# Patient Record
Sex: FEMALE | Race: WHITE | NOT HISPANIC OR LATINO
[De-identification: names, ages, dates, MRNs, and addresses within clinical notes are randomized per-mention and may not be internally consistent; named-entity substitution may affect disease eponyms.]

---

## 2017-08-16 ENCOUNTER — APPOINTMENT (OUTPATIENT)
Dept: ELECTROPHYSIOLOGY | Facility: CLINIC | Age: 62
End: 2017-08-16
Payer: COMMERCIAL

## 2017-08-16 ENCOUNTER — NON-APPOINTMENT (OUTPATIENT)
Age: 62
End: 2017-08-16

## 2017-08-16 VITALS
DIASTOLIC BLOOD PRESSURE: 79 MMHG | HEART RATE: 65 BPM | SYSTOLIC BLOOD PRESSURE: 129 MMHG | WEIGHT: 130 LBS | HEIGHT: 67 IN | BODY MASS INDEX: 20.4 KG/M2 | OXYGEN SATURATION: 100 %

## 2017-08-16 DIAGNOSIS — Z86.79 PERSONAL HISTORY OF OTHER DISEASES OF THE CIRCULATORY SYSTEM: ICD-10-CM

## 2017-08-16 DIAGNOSIS — R53.83 OTHER FATIGUE: ICD-10-CM

## 2017-08-16 DIAGNOSIS — Z87.19 PERSONAL HISTORY OF OTHER DISEASES OF THE DIGESTIVE SYSTEM: ICD-10-CM

## 2017-08-16 PROCEDURE — 99205 OFFICE O/P NEW HI 60 MIN: CPT

## 2017-08-16 PROCEDURE — 93000 ELECTROCARDIOGRAM COMPLETE: CPT

## 2017-09-01 ENCOUNTER — INPATIENT (INPATIENT)
Facility: HOSPITAL | Age: 62
LOS: 0 days | Discharge: HOME | DRG: 274 | End: 2017-09-02
Attending: INTERNAL MEDICINE | Admitting: INTERNAL MEDICINE
Payer: COMMERCIAL

## 2017-09-01 ENCOUNTER — TRANSCRIPTION ENCOUNTER (OUTPATIENT)
Age: 62
End: 2017-09-01

## 2017-09-01 VITALS
RESPIRATION RATE: 20 BRPM | TEMPERATURE: 98 F | WEIGHT: 130.07 LBS | DIASTOLIC BLOOD PRESSURE: 75 MMHG | OXYGEN SATURATION: 100 % | SYSTOLIC BLOOD PRESSURE: 153 MMHG | HEART RATE: 84 BPM | HEIGHT: 67 IN

## 2017-09-01 DIAGNOSIS — Z98.890 OTHER SPECIFIED POSTPROCEDURAL STATES: Chronic | ICD-10-CM

## 2017-09-01 DIAGNOSIS — I48.0 PAROXYSMAL ATRIAL FIBRILLATION: ICD-10-CM

## 2017-09-01 DIAGNOSIS — Z29.9 ENCOUNTER FOR PROPHYLACTIC MEASURES, UNSPECIFIED: ICD-10-CM

## 2017-09-01 DIAGNOSIS — I48.91 UNSPECIFIED ATRIAL FIBRILLATION: ICD-10-CM

## 2017-09-01 LAB
ALBUMIN SERPL ELPH-MCNC: 4.1 G/DL — SIGNIFICANT CHANGE UP (ref 3.3–5)
ALP SERPL-CCNC: 49 U/L — SIGNIFICANT CHANGE UP (ref 40–120)
ALT FLD-CCNC: 22 U/L RC — SIGNIFICANT CHANGE UP (ref 10–45)
ANION GAP SERPL CALC-SCNC: 14 MMOL/L — SIGNIFICANT CHANGE UP (ref 5–17)
AST SERPL-CCNC: 27 U/L — SIGNIFICANT CHANGE UP (ref 10–40)
BILIRUB SERPL-MCNC: 0.3 MG/DL — SIGNIFICANT CHANGE UP (ref 0.2–1.2)
BLD GP AB SCN SERPL QL: NEGATIVE — SIGNIFICANT CHANGE UP
BUN SERPL-MCNC: 12 MG/DL — SIGNIFICANT CHANGE UP (ref 7–23)
CALCIUM SERPL-MCNC: 8.6 MG/DL — SIGNIFICANT CHANGE UP (ref 8.4–10.5)
CHLORIDE SERPL-SCNC: 105 MMOL/L — SIGNIFICANT CHANGE UP (ref 96–108)
CO2 SERPL-SCNC: 23 MMOL/L — SIGNIFICANT CHANGE UP (ref 22–31)
CREAT SERPL-MCNC: 0.64 MG/DL — SIGNIFICANT CHANGE UP (ref 0.5–1.3)
GLUCOSE SERPL-MCNC: 120 MG/DL — HIGH (ref 70–99)
HCT VFR BLD CALC: 37.7 % — SIGNIFICANT CHANGE UP (ref 34.5–45)
HGB BLD-MCNC: 12.8 G/DL — SIGNIFICANT CHANGE UP (ref 11.5–15.5)
MAGNESIUM SERPL-MCNC: 1.7 MG/DL — SIGNIFICANT CHANGE UP (ref 1.6–2.6)
MCHC RBC-ENTMCNC: 32 PG — SIGNIFICANT CHANGE UP (ref 27–34)
MCHC RBC-ENTMCNC: 33.9 GM/DL — SIGNIFICANT CHANGE UP (ref 32–36)
MCV RBC AUTO: 94.6 FL — SIGNIFICANT CHANGE UP (ref 80–100)
PHOSPHATE SERPL-MCNC: 3 MG/DL — SIGNIFICANT CHANGE UP (ref 2.5–4.5)
PLATELET # BLD AUTO: 193 K/UL — SIGNIFICANT CHANGE UP (ref 150–400)
POTASSIUM SERPL-MCNC: 4 MMOL/L — SIGNIFICANT CHANGE UP (ref 3.5–5.3)
POTASSIUM SERPL-SCNC: 4 MMOL/L — SIGNIFICANT CHANGE UP (ref 3.5–5.3)
PROT SERPL-MCNC: 6.4 G/DL — SIGNIFICANT CHANGE UP (ref 6–8.3)
RBC # BLD: 3.98 M/UL — SIGNIFICANT CHANGE UP (ref 3.8–5.2)
RBC # FLD: 12.8 % — SIGNIFICANT CHANGE UP (ref 10.3–14.5)
RH IG SCN BLD-IMP: POSITIVE — SIGNIFICANT CHANGE UP
SODIUM SERPL-SCNC: 142 MMOL/L — SIGNIFICANT CHANGE UP (ref 135–145)
WBC # BLD: 6.1 K/UL — SIGNIFICANT CHANGE UP (ref 3.8–10.5)
WBC # FLD AUTO: 6.1 K/UL — SIGNIFICANT CHANGE UP (ref 3.8–10.5)

## 2017-09-01 PROCEDURE — 93656 COMPRE EP EVAL ABLTJ ATR FIB: CPT

## 2017-09-01 PROCEDURE — 93655 ICAR CATH ABLTJ DSCRT ARRHYT: CPT

## 2017-09-01 PROCEDURE — 93613 INTRACARDIAC EPHYS 3D MAPG: CPT

## 2017-09-01 PROCEDURE — 93623 PRGRMD STIMJ&PACG IV RX NFS: CPT | Mod: 26

## 2017-09-01 PROCEDURE — 93662 INTRACARDIAC ECG (ICE): CPT | Mod: 26

## 2017-09-01 PROCEDURE — 93010 ELECTROCARDIOGRAM REPORT: CPT

## 2017-09-01 RX ORDER — SUCRALFATE 1 G
1 TABLET ORAL EVERY 6 HOURS
Qty: 0 | Refills: 0 | Status: DISCONTINUED | OUTPATIENT
Start: 2017-09-01 | End: 2017-09-02

## 2017-09-01 RX ORDER — PANTOPRAZOLE SODIUM 20 MG/1
40 TABLET, DELAYED RELEASE ORAL
Qty: 0 | Refills: 0 | Status: DISCONTINUED | OUTPATIENT
Start: 2017-09-01 | End: 2017-09-02

## 2017-09-01 RX ORDER — METOPROLOL TARTRATE 50 MG
50 TABLET ORAL
Qty: 0 | Refills: 0 | Status: DISCONTINUED | OUTPATIENT
Start: 2017-09-01 | End: 2017-09-02

## 2017-09-01 RX ORDER — METOPROLOL TARTRATE 50 MG
1 TABLET ORAL
Qty: 0 | Refills: 0 | COMMUNITY

## 2017-09-01 RX ORDER — APIXABAN 2.5 MG/1
1 TABLET, FILM COATED ORAL
Qty: 0 | Refills: 0 | COMMUNITY

## 2017-09-01 RX ADMIN — Medication 1 GRAM(S): at 22:47

## 2017-09-01 RX ADMIN — Medication 50 MILLIGRAM(S): at 22:47

## 2017-09-01 NOTE — PROGRESS NOTE ADULT - PROBLEM SELECTOR PLAN 1
admit to CCU2; admission labs  monitor rhythm  monitor bilateral groin access sites  Start Protonix & Carafate and mechanical soft diet  Restart home Metoprolol  Start Heparin infusion 6 hours post sheath removal (11pm tonight) if access sites stable.  Monitor PTT to therapeutic  TTE in am for evaluation pericardial effusion

## 2017-09-01 NOTE — DISCHARGE NOTE ADULT - CARE PROVIDER_API CALL
Salvatore Sheth), Cardiac Electrophysiology; Cardiology  [address] [place] [address]  Phone: [phone]  Fax: [phone]

## 2017-09-01 NOTE — H&P CARDIOLOGY - HISTORY OF PRESENT ILLNESS
63 y/o female with h/o Breast CA s/p left lumpectomy, tx with radiation and chemotherapy and PAF s/p ablation in 3/2017 endorsing several episodes of palpitations, lightheadedness and dyspnea post ablation.  Patient was evaluated by Dr. Sheth and is now for Afib ablation.

## 2017-09-01 NOTE — PROGRESS NOTE ADULT - SUBJECTIVE AND OBJECTIVE BOX
CCU Accept Note    Transfer from: EP lab via bed    HPI:  61 y/o female with h/o Breast CA s/p left lumpectomy, tx with radiation and chemotherapy and PAF s/p ablation in 3/2017 endorsing several episodes of palpitations, lightheadedness and dyspnea post ablation.  Patient was evaluated by Dr. Sheth and is now for Afib ablation. (01 Sep 2017 11:46)    Interval - now s/p A fib ablation & AVNRT ablated    OBJECTIVE DATA:    Vital Signs Last 24 Hrs  T(C): 36.5 (01 Sep 2017 11:46), Max: 36.5 (01 Sep 2017 11:46)  T(F): 97.7 (01 Sep 2017 11:46), Max: 97.7 (01 Sep 2017 11:46)  HR: 83 (01 Sep 2017 18:45) (83 - 98)  BP: 153/75 (01 Sep 2017 11:46) (153/75 - 153/75)  BP(mean): 101 (01 Sep 2017 11:46) (101 - 101)  RR: 15 (01 Sep 2017 18:45) (15 - 22)  SpO2: 98% (01 Sep 2017 18:45) (98% - 100%)  I&O's Summary  from EP lab I/O =1600/ 200    PHYSICAL EXAM:  Constitutional:    HEENT:    Respiratory:    Cardiovascular:    Gastrointestinal:    Genitourinary:    Extremities:    Vascular:    Neurological:    Skin:    Lymph Nodes:    Musculoskeletal:    Psychiatric:        Allergies:      MEDICATIONS  (STANDING):  metoprolol 50 milliGRAM(s) Oral two times a day  pantoprazole    Tablet 40 milliGRAM(s) Oral before breakfast  sucralfate 1 Gram(s) Oral every 6 hours    MEDICATIONS  (PRN):      LABS                                            12.8                  Neurophils% (auto):   x      (09-01 @ 18:18):    6.1  )-----------(193          Lymphocytes% (auto):  x                                             37.7                   Eosinphils% (auto):   x        Manual%: Neutrophils x    ; Lymphocytes x    ; Eosinophils x    ; Bands%: x    ; Blasts x                                    142    |  105    |  12                  Calcium: 8.6   / iCa: x      (09-01 @ 18:18)    ----------------------------<  120       Magnesium: 1.7                              4.0     |  23     |  0.64             Phosphorous: 3.0      TPro  6.4    /  Alb  4.1    /  TBili  0.3    /  DBili  x      /  AST  27     /  ALT  22     /  AlkPhos  49     01 Sep 2017 18:18        EKG:  Telemetry:    ASSESSMENT & PLAN: CCU Accept Note    Transfer from: EP lab via bed    HPI:  61 y/o female with h/o Breast CA s/p left lumpectomy, tx with radiation and chemotherapy and PAF s/p ablation in 3/2017 endorsing several episodes of palpitations, lightheadedness and dyspnea post ablation.  Patient was evaluated by Dr. Sheth and is now for Afib ablation. (01 Sep 2017 11:46)    Interval - now s/p A fib ablation & AVNRT ablated    OBJECTIVE DATA:    Vital Signs Last 24 Hrs  T(C): 36.5 (01 Sep 2017 11:46), Max: 36.5 (01 Sep 2017 11:46)  T(F): 97.7 (01 Sep 2017 11:46), Max: 97.7 (01 Sep 2017 11:46)  HR: 83 (01 Sep 2017 18:45) (83 - 98)  BP: 153/75 (01 Sep 2017 11:46) (153/75 - 153/75)  BP(mean): 101 (01 Sep 2017 11:46) (101 - 101)  RR: 15 (01 Sep 2017 18:45) (15 - 22)  SpO2: 98% (01 Sep 2017 18:45) (98% - 100%)  I&O's Summary  from EP lab I/O =1600/ 200    PHYSICAL EXAM:  Constitutional: normal, sleepy easily awaken NAD  HEENT: NC/AT; sclera clear  Respiratory: regular unlabored; CTA  Cardiovascular: RRR, S1 S2; no M/R/G, no edema  Bilateral groin access sites - dressings with suture D & I, soft no hematoma/ecchymosis; left site slightly tender to touch  Gastrointestinal: soft ND/NT; + bowel sounds  Genitourinary: urine cath in place draining clear yellow urine  Extremities: VELASQUEZ equal strength  Vascular: warm peripherally, +DP  Neurological: A & O x3   Skin: warm dry intact no rash, ecchymosis or cyanosis  Lymph Nodes: no lymphadenopathy  Musculoskeletal: no deformity, VELASQUEZ  Psychiatric: mood & affect appropriate        Allergies:      MEDICATIONS  (STANDING):  metoprolol 50 milliGRAM(s) Oral two times a day  pantoprazole    Tablet 40 milliGRAM(s) Oral before breakfast  sucralfate 1 Gram(s) Oral every 6 hours    MEDICATIONS  (PRN):      LABS                                            12.8                  Neurophils% (auto):   x      (09-01 @ 18:18):    6.1  )-----------(193          Lymphocytes% (auto):  x                                             37.7                   Eosinphils% (auto):   x        Manual%: Neutrophils x    ; Lymphocytes x    ; Eosinophils x    ; Bands%: x    ; Blasts x                                    142    |  105    |  12                  Calcium: 8.6   / iCa: x      (09-01 @ 18:18)    ----------------------------<  120       Magnesium: 1.7                              4.0     |  23     |  0.64             Phosphorous: 3.0      TPro  6.4    /  Alb  4.1    /  TBili  0.3    /  DBili  x      /  AST  27     /  ALT  22     /  AlkPhos  49     01 Sep 2017 18:18          Telemetry: sinus rhythm no ectopy or events    ASSESSMENT & PLAN:

## 2017-09-01 NOTE — PROGRESS NOTE ADULT - ASSESSMENT
61 y/o female with h/o Breast CA s/p left lumpectomy, tx with radiation and chemotherapy and PAF s/p ablation in 3/2017 endorsing several episodes of palpitations, lightheadedness and dyspnea. Now s/p A fib ablation

## 2017-09-01 NOTE — DISCHARGE NOTE ADULT - PLAN OF CARE
will remain in sinus rhythm It is important to continue your normal daily activities and to continue to walk as much as possible; with periods of rest when necessary.  Do not perform any strenuous activity or heavy lifting until cleared by Dr. Sheth. Your heart muscle is currently recovering and you should not be exerting it.  In addition there is no bathing in a bathtub, swimming, or submerging you in water until cleared by Dr. Sheth. You have incisions that need to heal and bathing/swimming can expose it to bacteria.  No creams to your incisions. You may remove your dressings when you get home. You may shower. Allow soap and water to run over your incision and pat area dry. Ensure that your groin incisions remain dry at all times to prevent risk of infection.  Follow up with Dr. Sheth, you have an appointment scheduled for October 19th at 8:20am. Follow up with your primary cardiologist as well.   You are restarting Eliquis. Eliquis is a blood thinner and therefore you are at higher risk of bleeding. If you experience any signs of atrial fibrillation such as dizziness, fatigue, palpitations, or fainting please inform Dr. Sheht as soon as possible or go to your nearest emergency room.  If you experience any signs of bleeding such as bleeding gums, bloody sputum, bleeding in your stool or black stool inform your primary care doctor;  You should continue to maintain a heart healthy diet ( low cholesterol, low sodium, and low in saturated fats).You should also continue to eat a soft diet. A soft diet means food that is pureed in consistency, like mash potatoes, yogurts, soft cooked vegetables, soups. You should maintain this diet for one month. This will prevent any irritation to your esophagus which may be weakened due to the procedure. You also should take your carafate and protonix medications as prescribed, for one month to prevent any acid from your stomach. This will prevent further irritation by reducing the acid content in your stomach.

## 2017-09-01 NOTE — DISCHARGE NOTE ADULT - CONDITIONS AT DISCHARGE
A&Ox4, denies any chest pain or shortness of breath. Bilateral groin sites benign, no bleeding or hematoma noted. Right radial site is benign, no bleeding or hematoma noted.

## 2017-09-01 NOTE — DISCHARGE NOTE ADULT - PATIENT PORTAL LINK FT
“You can access the [employer] Patient Portal, offered by [facility], by registering with the following website: [email or web address]”

## 2017-09-01 NOTE — DISCHARGE NOTE ADULT - CARE PLAN
Principal Discharge DX:	Paroxysmal atrial fibrillation  Goal:	will remain in sinus rhythm  Instructions for follow-up, activity and diet:	It is important to continue your normal daily activities and to continue to walk as much as possible; with periods of rest when necessary.  Do not perform any strenuous activity or heavy lifting until cleared by Dr. Sheth. Your heart muscle is currently recovering and you should not be exerting it.  In addition there is no bathing in a bathtub, swimming, or submerging you in water until cleared by Dr. Sheth. You have incisions that need to heal and bathing/swimming can expose it to bacteria.  No creams to your incisions. You may remove your dressings when you get home. You may shower. Allow soap and water to run over your incision and pat area dry. Ensure that your groin incisions remain dry at all times to prevent risk of infection.  Follow up with Dr. Sheth, you have an appointment scheduled for October 19th at 8:20am. Follow up with your primary cardiologist as well.   You are restarting Eliquis. Eliquis is a blood thinner and therefore you are at higher risk of bleeding. If you experience any signs of atrial fibrillation such as dizziness, fatigue, palpitations, or fainting please inform Dr. Sheth as soon as possible or go to your nearest emergency room.  If you experience any signs of bleeding such as bleeding gums, bloody sputum, bleeding in your stool or black stool inform your primary care doctor;  You should continue to maintain a heart healthy diet ( low cholesterol, low sodium, and low in saturated fats).You should also continue to eat a soft diet. A soft diet means food that is pureed in consistency, like mash potatoes, yogurts, soft cooked vegetables, soups. You should maintain this diet for one month. This will prevent any irritation to your esophagus which may be weakened due to the procedure. You also should take your carafate and protonix medications as prescribed, for one month to prevent any acid from your stomach. This will prevent further irritation by reducing the acid content in your stomach.

## 2017-09-01 NOTE — DISCHARGE NOTE ADULT - MEDICATION SUMMARY - MEDICATIONS TO TAKE
I will START or STAY ON the medications listed below when I get home from the hospital:    Eliquis 5 mg oral tablet  -- 1 tab(s) by mouth 2 times a day  -- Indication: For Atrial fibrillation    metoprolol tartrate 50 mg oral tablet  -- 1 tab(s) by mouth 2 times a day  -- Indication: For Atrial fibrillation    sucralfate 1 g oral tablet  -- 1 tab(s) by mouth every 6 hours  -- Indication: For Atrial fibrillation    pantoprazole 40 mg oral delayed release tablet  -- 1 tab(s) by mouth once a day (before a meal)  -- Indication: For Atrial fibrillation

## 2017-09-01 NOTE — DISCHARGE NOTE ADULT - HOSPITAL COURSE
63 y/o female with h/o Breast CA s/p left lumpectomy, tx with radiation and chemotherapy and PAF (s/p ablation in 3/2017 endorsing several episodes of palpitations, lightheadedness and dyspnea post ablation).   Now s/p A fib ablation 63 y/o female with h/o Breast CA s/p left lumpectomy, tx with radiation and chemotherapy and PAF (s/p ablation in 3/2017 endorsing several episodes of palpitations, lightheadedness and dyspnea post ablation). Now s/p A fib ablation.  TTE shows no pericardial effusion and patient restarted on Eliquis.

## 2017-09-02 VITALS
SYSTOLIC BLOOD PRESSURE: 129 MMHG | OXYGEN SATURATION: 98 % | RESPIRATION RATE: 18 BRPM | HEART RATE: 68 BPM | DIASTOLIC BLOOD PRESSURE: 58 MMHG

## 2017-09-02 DIAGNOSIS — H53.19 OTHER SUBJECTIVE VISUAL DISTURBANCES: ICD-10-CM

## 2017-09-02 LAB
ALBUMIN SERPL ELPH-MCNC: 3.8 G/DL — SIGNIFICANT CHANGE UP (ref 3.3–5)
ALP SERPL-CCNC: 47 U/L — SIGNIFICANT CHANGE UP (ref 40–120)
ALT FLD-CCNC: 20 U/L RC — SIGNIFICANT CHANGE UP (ref 10–45)
ANION GAP SERPL CALC-SCNC: 12 MMOL/L — SIGNIFICANT CHANGE UP (ref 5–17)
APTT BLD: 27.8 SEC — SIGNIFICANT CHANGE UP (ref 27.5–37.4)
AST SERPL-CCNC: 28 U/L — SIGNIFICANT CHANGE UP (ref 10–40)
BILIRUB SERPL-MCNC: 0.4 MG/DL — SIGNIFICANT CHANGE UP (ref 0.2–1.2)
BUN SERPL-MCNC: 13 MG/DL — SIGNIFICANT CHANGE UP (ref 7–23)
CALCIUM SERPL-MCNC: 8.6 MG/DL — SIGNIFICANT CHANGE UP (ref 8.4–10.5)
CHLORIDE SERPL-SCNC: 104 MMOL/L — SIGNIFICANT CHANGE UP (ref 96–108)
CO2 SERPL-SCNC: 25 MMOL/L — SIGNIFICANT CHANGE UP (ref 22–31)
CREAT SERPL-MCNC: 0.65 MG/DL — SIGNIFICANT CHANGE UP (ref 0.5–1.3)
GLUCOSE SERPL-MCNC: 96 MG/DL — SIGNIFICANT CHANGE UP (ref 70–99)
HCT VFR BLD CALC: 37 % — SIGNIFICANT CHANGE UP (ref 34.5–45)
HGB BLD-MCNC: 12.8 G/DL — SIGNIFICANT CHANGE UP (ref 11.5–15.5)
INR BLD: 0.98 RATIO — SIGNIFICANT CHANGE UP (ref 0.88–1.16)
MAGNESIUM SERPL-MCNC: 1.9 MG/DL — SIGNIFICANT CHANGE UP (ref 1.6–2.6)
MCHC RBC-ENTMCNC: 32.7 PG — SIGNIFICANT CHANGE UP (ref 27–34)
MCHC RBC-ENTMCNC: 34.5 GM/DL — SIGNIFICANT CHANGE UP (ref 32–36)
MCV RBC AUTO: 94.8 FL — SIGNIFICANT CHANGE UP (ref 80–100)
PHOSPHATE SERPL-MCNC: 3.7 MG/DL — SIGNIFICANT CHANGE UP (ref 2.5–4.5)
PLATELET # BLD AUTO: 185 K/UL — SIGNIFICANT CHANGE UP (ref 150–400)
POTASSIUM SERPL-MCNC: 4.1 MMOL/L — SIGNIFICANT CHANGE UP (ref 3.5–5.3)
POTASSIUM SERPL-SCNC: 4.1 MMOL/L — SIGNIFICANT CHANGE UP (ref 3.5–5.3)
PROT SERPL-MCNC: 6.1 G/DL — SIGNIFICANT CHANGE UP (ref 6–8.3)
PROTHROM AB SERPL-ACNC: 10.6 SEC — SIGNIFICANT CHANGE UP (ref 9.8–12.7)
RBC # BLD: 3.9 M/UL — SIGNIFICANT CHANGE UP (ref 3.8–5.2)
RBC # FLD: 12.8 % — SIGNIFICANT CHANGE UP (ref 10.3–14.5)
SODIUM SERPL-SCNC: 141 MMOL/L — SIGNIFICANT CHANGE UP (ref 135–145)
WBC # BLD: 9.2 K/UL — SIGNIFICANT CHANGE UP (ref 3.8–10.5)
WBC # FLD AUTO: 9.2 K/UL — SIGNIFICANT CHANGE UP (ref 3.8–10.5)

## 2017-09-02 PROCEDURE — 93655 ICAR CATH ABLTJ DSCRT ARRHYT: CPT

## 2017-09-02 PROCEDURE — 93623 PRGRMD STIMJ&PACG IV RX NFS: CPT

## 2017-09-02 PROCEDURE — 83735 ASSAY OF MAGNESIUM: CPT

## 2017-09-02 PROCEDURE — 99232 SBSQ HOSP IP/OBS MODERATE 35: CPT | Mod: GC

## 2017-09-02 PROCEDURE — 93613 INTRACARDIAC EPHYS 3D MAPG: CPT

## 2017-09-02 PROCEDURE — 93662 INTRACARDIAC ECG (ICE): CPT

## 2017-09-02 PROCEDURE — C1732: CPT

## 2017-09-02 PROCEDURE — 86900 BLOOD TYPING SEROLOGIC ABO: CPT

## 2017-09-02 PROCEDURE — 86850 RBC ANTIBODY SCREEN: CPT

## 2017-09-02 PROCEDURE — 85730 THROMBOPLASTIN TIME PARTIAL: CPT

## 2017-09-02 PROCEDURE — C1766: CPT

## 2017-09-02 PROCEDURE — 99222 1ST HOSP IP/OBS MODERATE 55: CPT | Mod: GC

## 2017-09-02 PROCEDURE — C1893: CPT

## 2017-09-02 PROCEDURE — 85027 COMPLETE CBC AUTOMATED: CPT

## 2017-09-02 PROCEDURE — 93306 TTE W/DOPPLER COMPLETE: CPT | Mod: 26

## 2017-09-02 PROCEDURE — 85610 PROTHROMBIN TIME: CPT

## 2017-09-02 PROCEDURE — 93656 COMPRE EP EVAL ABLTJ ATR FIB: CPT

## 2017-09-02 PROCEDURE — C1894: CPT

## 2017-09-02 PROCEDURE — C1759: CPT

## 2017-09-02 PROCEDURE — C8929: CPT

## 2017-09-02 PROCEDURE — 93005 ELECTROCARDIOGRAM TRACING: CPT

## 2017-09-02 PROCEDURE — 93010 ELECTROCARDIOGRAM REPORT: CPT

## 2017-09-02 PROCEDURE — 84100 ASSAY OF PHOSPHORUS: CPT

## 2017-09-02 PROCEDURE — 80053 COMPREHEN METABOLIC PANEL: CPT

## 2017-09-02 PROCEDURE — C1731: CPT

## 2017-09-02 PROCEDURE — 86901 BLOOD TYPING SEROLOGIC RH(D): CPT

## 2017-09-02 RX ORDER — PANTOPRAZOLE SODIUM 20 MG/1
1 TABLET, DELAYED RELEASE ORAL
Qty: 30 | Refills: 0 | OUTPATIENT
Start: 2017-09-02 | End: 2017-10-02

## 2017-09-02 RX ORDER — HEPARIN SODIUM 5000 [USP'U]/ML
INJECTION INTRAVENOUS; SUBCUTANEOUS
Qty: 25000 | Refills: 0 | Status: DISCONTINUED | OUTPATIENT
Start: 2017-09-02 | End: 2017-09-02

## 2017-09-02 RX ORDER — HEPARIN SODIUM 5000 [USP'U]/ML
3800 INJECTION INTRAVENOUS; SUBCUTANEOUS EVERY 6 HOURS
Qty: 0 | Refills: 0 | Status: DISCONTINUED | OUTPATIENT
Start: 2017-09-02 | End: 2017-09-02

## 2017-09-02 RX ORDER — SUCRALFATE 1 G
1 TABLET ORAL
Qty: 120 | Refills: 0 | OUTPATIENT
Start: 2017-09-02 | End: 2017-10-02

## 2017-09-02 RX ORDER — APIXABAN 2.5 MG/1
5 TABLET, FILM COATED ORAL EVERY 12 HOURS
Qty: 0 | Refills: 0 | Status: DISCONTINUED | OUTPATIENT
Start: 2017-09-02 | End: 2017-09-02

## 2017-09-02 RX ADMIN — PANTOPRAZOLE SODIUM 40 MILLIGRAM(S): 20 TABLET, DELAYED RELEASE ORAL at 06:19

## 2017-09-02 RX ADMIN — Medication 1 GRAM(S): at 06:19

## 2017-09-02 RX ADMIN — Medication 1 GRAM(S): at 11:21

## 2017-09-02 RX ADMIN — APIXABAN 5 MILLIGRAM(S): 2.5 TABLET, FILM COATED ORAL at 11:21

## 2017-09-02 NOTE — CONSULT NOTE ADULT - ASSESSMENT
61 y/o woman w/ PMH ocular migraines admitted s/p ablation for A fib c/o 15 mins transient flashing lights/rainbow lights in b/l visual fields. Neurologic examination is nonfocal. Given patient's prior history and symptom description, symptoms are likely scintillating scotomas 2/2 recurrent ocular migraine, unlikely intracerebral ischemic event.

## 2017-09-02 NOTE — PROGRESS NOTE ADULT - ASSESSMENT
This is a 62 year old woman with past medical history of Breast CA s/p left lumpectomy, tx with radiation and chemotherapy and PAF s/p ablation in 3/2017 endorsing several episodes of palpitations, lightheadedness and dyspnea post ablation.  Patient was evaluated by Dr. Sheth and is now for Afib ablation.  Patient now s/p afib ablation.  Course complicated with visual disturbances 2/2 ocular migraines.

## 2017-09-02 NOTE — CONSULT NOTE ADULT - SUBJECTIVE AND OBJECTIVE BOX
Neurology Consult Note    Name  ALEX JULES    HPI:  63 y/o female with h/o ocular migraines, Breast CA grade IB s/p left lumpectomy, tx with radiation and chemotherapy (in remission) and PAF s/p ablation in 3/2017 endorsing several episodes of palpitations, lightheadedness and dyspnea post ablation.  Patient was evaluated by Dr. Sheth and admitted for A fib ablation. After ablation today, patient was sitting up in bed eating, when she experienced visual changes bilaterally. She describes mountain peaks in peripheral vision b/l, first black and white, then becoming rainbow colors. Lasted 15 minutes. No associated headache at the time, though did have headache this morning. Pt has experienced same symptoms in the past, lasting same length of time, however does not recall it involving both eyes. No vision loss, diplopia, focal weakness, or numbness. Symptoms are resolved. Pt is on heparin drip following procedure, and prior to procedure was on oral anticoagulation.    Review of Systems:  Constitutional: No fever, chills, fatigue, weakness  Eyes: As above  ENT:  No difficulty hearing, tinnitus, vertigo; No sinus or throat pain  Neck: No pain or stiffness  Respiratory: No cough, dyspnea, wheezing   Cardiovascular: No chest pain, palpitations,   Gastrointestinal: No abdominal or epigastric pain. No nausea, vomiting  No diarrhea or constipation.   Genitourinary: No dysuria, frequency, hematuria or incontinence  Neurological: As above  Psychiatric: No depression, anxiety, mood swings or difficulty sleeping  Musculoskeletal: No joint pain or swelling; No muscle, back or extremity pain  Skin: No itching, burning, rashes or lesions   Lymph Nodes: No enlarged glands  Endocrine: No heat or cold intolerance  Allergy and Immunologic: No hives or eczema    MEDICATIONS  (STANDING):  metoprolol 50 milliGRAM(s) Oral two times a day  pantoprazole    Tablet 40 milliGRAM(s) Oral before breakfast  sucralfate 1 Gram(s) Oral every 6 hours  Heparin gtt    All: Sulfa drugs  PMH: As above, 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  PSH: As above  FH: Father had strokes  SH: Denies cigarette, alcohol, or drug use      Objective:   Vital Signs Last 24 Hrs  T(C): 36.5 (01 Sep 2017 11:46), Max: 36.5 (01 Sep 2017 11:46)  T(F): 97.7 (01 Sep 2017 11:46), Max: 97.7 (01 Sep 2017 11:46)  HR: 87 (01 Sep 2017 23:00) (83 - 99)  BP: 148/64 (01 Sep 2017 23:00) (148/64 - 153/75)  BP(mean): 84 (01 Sep 2017 23:00) (76 - 101)  RR: 12 (01 Sep 2017 23:00) (12 - 25)  SpO2: 97% (01 Sep 2017 23:00) (97% - 100%)    General Exam:   General appearance: No acute distress                   Neurological Exam:  Mental Status: Orientated to self, date and place.  Speech fluent. Follows commands. Repetition intact.    Cranial Nerves:PERRL, EOMI, VFF, CN V1-3 intact to light touch.  No facial asymmetry. Tongue, uvula and palate midline.  Sternocleidomastoid and Trapezius intact bilaterally.    Motor:   Tone: normal.                  Strength: 5/5 b/l UEs. B/l LEs limited motion following procedure. DF/PF 5/5 b/l.  Tremor: No resting, postural or action tremor.  No myoclonus.    Sensation: intact to light touch x 4 extremities. No extinction on double simultaneous stimulation.    Deep Tendon Reflexes: 2+ bilateral biceps, triceps, brachioradialis, knee and ankle    Coord: FTN no ataxia or dysmetria.    Other:      09-01                          12.8   6.1   )-----------( 193                   37.7     142  |  105  |  12  ----------------------------<  120<H>  4.0   |  23  |  0.64    Ca    8.6      01 Sep 2017 18:18  Phos  3.0     09-01  Mg     1.7     09-01    TPro  6.4  /  Alb  4.1  /  TBili  0.3  /  DBili  x   /  AST  27  /  ALT  22  /  AlkPhos  49  09-01    LIVER FUNCTIONS - ( 01 Sep 2017 18:18 )  Alb: 4.1 g/dL / Pro: 6.4 g/dL / ALK PHOS: 49 U/L / ALT: 22 U/L RC / AST: 27 U/L / GGT: x

## 2017-09-02 NOTE — CONSULT NOTE ADULT - PROBLEM SELECTOR RECOMMENDATION 9
- No neurologic intervention/further tests recommended at this time  - Continue all medical management as per primary team

## 2017-09-02 NOTE — PROGRESS NOTE ADULT - SUBJECTIVE AND OBJECTIVE BOX
CHIEF COMPLAINT::    INTERVAL HISTORY:    REVIEW OF SYSTEMS: all others negative    MEDICATIONS  (STANDING):  metoprolol 50 milliGRAM(s) Oral two times a day  pantoprazole    Tablet 40 milliGRAM(s) Oral before breakfast  sucralfate 1 Gram(s) Oral every 6 hours    MEDICATIONS  (PRN):      Objective:  Vital Signs Last 24 Hrs  T(C): 36.5 (01 Sep 2017 11:46), Max: 36.5 (01 Sep 2017 11:46)  T(F): 97.7 (01 Sep 2017 11:46), Max: 97.7 (01 Sep 2017 11:46)  HR: 65 (02 Sep 2017 06:00) (59 - 99)  BP: 127/61 (02 Sep 2017 06:00) (122/54 - 153/75)  BP(mean): 80 (02 Sep 2017 06:00) (70 - 101)  RR: 16 (02 Sep 2017 06:00) (12 - 25)  SpO2: 98% (02 Sep 2017 06:00) (97% - 100%)  ICU Vital Signs Last 24 Hrs  T(C): 36.5 (01 Sep 2017 11:46), Max: 36.5 (01 Sep 2017 11:46)  T(F): 97.7 (01 Sep 2017 11:46), Max: 97.7 (01 Sep 2017 11:46)  HR: 65 (02 Sep 2017 06:00) (59 - 99)  BP: 127/61 (02 Sep 2017 06:00) (122/54 - 153/75)  BP(mean): 80 (02 Sep 2017 06:00) (70 - 101)  ABP: 124/58 (01 Sep 2017 21:30) (124/58 - 163/75)  ABP(mean): 82 (01 Sep 2017 21:30) (82 - 106)  RR: 16 (02 Sep 2017 06:00) (12 - 25)  SpO2: 98% (02 Sep 2017 06:00) (97% - 100%)      Adult Advanced Hemodynamics Last 24 Hrs  CVP(mm Hg): --  CVP(cm H2O): --  CO: --  CI: --  PA: --  PA(mean): --  PCWP: --  SVR: --  SVRI: --  PVR: --  PVRI: --       @ 07:01  -  -02 @ 06:38  --------------------------------------------------------  IN: 240 mL / OUT: 1425 mL / NET: -1185 mL      Daily Height in cm: 170.18 (01 Sep 2017 17:30)    Daily Weight in k.2 (01 Sep 2017 18:00)    PHYSICAL EXAM:      Constitutional:    Eyes:    ENMT:    Neck:    Breasts:    Back:    Respiratory:    Cardiovascular:    Gastrointestinal:    Genitourinary:    Rectal:    Extremities:    Vascular:    Neurological:    Skin:    Lymph Nodes:    Musculoskeletal:    Psychiatric:        TELEMETRY:     EKG:     CARDIAC CATH:     ECHO:    IMAGIN.8   9.2   )-----------( 185      ( 02 Sep 2017 06:04 )             37.0         141  |  104  |  13  ----------------------------<  96  4.1   |  25  |  0.65    Ca    8.6      02 Sep 2017 06:04  Phos  3.7     09-  Mg     1.9         TPro  6.1  /  Alb  3.8  /  TBili  0.4  /  DBili  x   /  AST  28  /  ALT  20  /  AlkPhos  47  09-02    LIVER FUNCTIONS - ( 02 Sep 2017 06:04 )  Alb: 3.8 g/dL / Pro: 6.1 g/dL / ALK PHOS: 47 U/L / ALT: 20 U/L RC / AST: 28 U/L / GGT: x           PT/INR - ( 02 Sep 2017 06:04 )   PT: 10.6 sec;   INR: 0.98 ratio         PTT - ( 02 Sep 2017 06:04 )  PTT:27.8 sec      *BLOOD GAS/ARTERIAL/MIXED/VENOUS  *LACTATE      HEALTH ISSUES - PROBLEM Dx:  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  Preventive measure: Preventive measure  Paroxysmal atrial fibrillation: Paroxysmal atrial fibrillation        HEALTH ISSUES - R/O PROBLEM Dx:      Karthikeyan Bates, CCU NP   # CHIEF COMPLAINT: Atrial Fibrillation    INTERVAL HISTORY: s/p Afib Ablation, visual disturbances 2/2 ocular migraines now resolved    REVIEW OF SYSTEMS: all others negative    MEDICATIONS  (STANDING):  metoprolol 50 milliGRAM(s) Oral two times a day  pantoprazole    Tablet 40 milliGRAM(s) Oral before breakfast  sucralfate 1 Gram(s) Oral every 6 hours    MEDICATIONS  (PRN):      Objective:  Vital Signs Last 24 Hrs  T(C): 36.5 (01 Sep 2017 11:46), Max: 36.5 (01 Sep 2017 11:46)  T(F): 97.7 (01 Sep 2017 11:46), Max: 97.7 (01 Sep 2017 11:46)  HR: 65 (02 Sep 2017 06:00) (59 - 99)  BP: 127/61 (02 Sep 2017 06:00) (122/54 - 153/75)  BP(mean): 80 (02 Sep 2017 06:00) (70 - 101)  RR: 16 (02 Sep 2017 06:00) (12 - 25)  SpO2: 98% (02 Sep 2017 06:00) (97% - 100%)  ICU Vital Signs Last 24 Hrs  T(C): 36.5 (01 Sep 2017 11:46), Max: 36.5 (01 Sep 2017 11:46)  T(F): 97.7 (01 Sep 2017 11:46), Max: 97.7 (01 Sep 2017 11:46)  HR: 65 (02 Sep 2017 06:00) (59 - 99)  BP: 127/61 (02 Sep 2017 06:00) (122/54 - 153/75)  BP(mean): 80 (02 Sep 2017 06:00) (70 - 101)  ABP: 124/58 (01 Sep 2017 21:30) (124/58 - 163/75)  ABP(mean): 82 (01 Sep 2017 21:30) (82 - 106)  RR: 16 (02 Sep 2017 06:00) (12 - 25)  SpO2: 98% (02 Sep 2017 06:00) (97% - 100%)      Adult Advanced Hemodynamics Last 24 Hrs  CVP(mm Hg): --  CVP(cm H2O): --  CO: --  CI: --  PA: --  PA(mean): --  PCWP: --  SVR: --  SVRI: --  PVR: --  PVRI: --       @ 07:01  -   @ 06:38  --------------------------------------------------------  IN: 240 mL / OUT: 1425 mL / NET: -1185 mL      Daily Height in cm: 170.18 (01 Sep 2017 17:30)    Daily Weight in k.2 (01 Sep 2017 18:00)    PHYSICAL EXAM:      Constitutional: No Acute Distress    Neuro: A+OX3    Respiratory: CTA bilaterally    Cardiovascular: S1S2 RRR    Gastrointestinal: +BS    Extremities: No pedal edema    Vascular: +2 Pedal Pulses          TELEMETRY: SR    EKG:     CARDIAC CATH:     ECHO:    IMAGIN.8   9.2   )-----------( 185      ( 02 Sep 2017 06:04 )             37.0     09-02    141  |  104  |  13  ----------------------------<  96  4.1   |  25  |  0.65    Ca    8.6      02 Sep 2017 06:04  Phos  3.7     09-02  Mg     1.9     09-02    TPro  6.1  /  Alb  3.8  /  TBili  0.4  /  DBili  x   /  AST  28  /  ALT  20  /  AlkPhos  47  09-02    LIVER FUNCTIONS - ( 02 Sep 2017 06:04 )  Alb: 3.8 g/dL / Pro: 6.1 g/dL / ALK PHOS: 47 U/L / ALT: 20 U/L RC / AST: 28 U/L / GGT: x           PT/INR - ( 02 Sep 2017 06:04 )   PT: 10.6 sec;   INR: 0.98 ratio         PTT - ( 02 Sep 2017 06:04 )  PTT:27.8 sec      *BLOOD GAS/ARTERIAL/MIXED/VENOUS  *LACTATE      HEALTH ISSUES - PROBLEM Dx:  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  Preventive measure: Preventive measure  Paroxysmal atrial fibrillation: Paroxysmal atrial fibrillation        HEALTH ISSUES - R/O PROBLEM Dx:      Karthikeyan Bates CCU NP   #

## 2017-09-02 NOTE — CHART NOTE - NSCHARTNOTEFT_GEN_A_CORE
[facility] MIDNIGHT ROUNDS    ALEX JULES  [id number]  62y  Female    SUMMARY:    HPI:  61 y/o female with h/o Breast CA s/p left lumpectomy, tx with radiation and chemotherapy and PAF s/p ablation in 3/2017 endorsing several episodes of palpitations, lightheadedness and dyspnea post ablation.  Patient was evaluated by Dr. Sheth and is now for Afib ablation. (01 Sep 2017 11:46)      NEW EVENTS: Patient experienced some floaters in her peripheral vision. Neurology was consulted and patient's scotoma's resolved. Condition most likely due to ocular migraine.  Currently resting with no complaints.      UPDATED VITALS:    ICU Vital Signs Last 24 Hrs  T(C): 36.5 (01 Sep 2017 11:46), Max: 36.5 (01 Sep 2017 11:46)  T(F): 97.7 (01 Sep 2017 11:46), Max: 97.7 (01 Sep 2017 11:46)  HR: 66 (02 Sep 2017 02:00) (66 - 99)  BP: 128/53 (02 Sep 2017 02:00) (128/53 - 153/75)  BP(mean): 72 (02 Sep 2017 02:00) (72 - 101)  ABP: 124/58 (01 Sep 2017 21:30) (124/58 - 163/75)  ABP(mean): 82 (01 Sep 2017 21:30) (82 - 106)  RR: 15 (02 Sep 2017 02:00) (12 - 25)  SpO2: 99% (02 Sep 2017 02:00) (97% - 100%)      I&O's Summary    01 Sep 2017 07:01  -  02 Sep 2017 02:21  --------------------------------------------------------  IN: 240 mL / OUT: 400 mL / NET: -160 mL            NEW LABS:                          12.8   6.1   )-----------( 193      ( 01 Sep 2017 18:18 )             37.7     09-01    142  |  105  |  12  ----------------------------<  120<H>  4.0   |  23  |  0.64    Ca    8.6      01 Sep 2017 18:18  Phos  3.0     09-01  Mg     1.7     09-01    TPro  6.4  /  Alb  4.1  /  TBili  0.3  /  DBili  x   /  AST  27  /  ALT  22  /  AlkPhos  49  09-01                PLAN: Afib s/p ablation  1) TTE in AM to rule out effusion  2) restart Eliquis if no effusion  3) cont heparin gtt for now  4) c/w carafate, protonix      GILMAR Hansen [id number]

## 2017-09-02 NOTE — CONSULT NOTE ADULT - ATTENDING COMMENTS
Ms. Estrada is a 62 year old female with a history of a fib s/p ablation who neurology was asked to see for ocular migraines. She states that she had migraines in college but since then has been getting exclusively ocular migraines. They occur infrequently, about once every few years. She sees "mountains" or triangles and this time they were multicolored, whereas normally they are black and white. She also saw them binocularly, R > L, whereas normally they are monocular. She has an outpatient ophthalmologist. Her neurologic examination is normal, including fundoscopic examination, eye movements, visual fields. A/P: Scotoma consistent with ocular migraines. No further workup necessary. There is no medication to treat scotomas. No further recommendations, please call with questions.

## 2017-09-02 NOTE — PROGRESS NOTE ADULT - ATTENDING COMMENTS
Patient seen and examined; agree with NP assessment and plan.  --Patient feeling well; appreciate Neurology input--likely ocular migraines.  --TTE done this AM to assess for effusion.  --Eliquis if no effusion.  --Close follow-up with EPS.

## 2017-09-02 NOTE — PROGRESS NOTE ADULT - PROBLEM SELECTOR PLAN 1
s/p Afib Ablation  Continue Heparin gtt  TTE to r/o pericardial effusions  Start Eliquis if no pericardial effusion

## 2017-10-10 ENCOUNTER — APPOINTMENT (OUTPATIENT)
Dept: ELECTROPHYSIOLOGY | Facility: CLINIC | Age: 62
End: 2017-10-10

## 2017-10-11 ENCOUNTER — NON-APPOINTMENT (OUTPATIENT)
Age: 62
End: 2017-10-11

## 2017-10-11 ENCOUNTER — APPOINTMENT (OUTPATIENT)
Dept: ELECTROPHYSIOLOGY | Facility: CLINIC | Age: 62
End: 2017-10-11
Payer: COMMERCIAL

## 2017-10-11 DIAGNOSIS — I48.0 PAROXYSMAL ATRIAL FIBRILLATION: ICD-10-CM

## 2017-10-11 DIAGNOSIS — R00.2 PALPITATIONS: ICD-10-CM

## 2017-10-11 PROCEDURE — 93000 ELECTROCARDIOGRAM COMPLETE: CPT

## 2017-10-11 PROCEDURE — 99213 OFFICE O/P EST LOW 20 MIN: CPT

## 2017-10-19 ENCOUNTER — APPOINTMENT (OUTPATIENT)
Dept: ELECTROPHYSIOLOGY | Facility: CLINIC | Age: 62
End: 2017-10-19

## 2019-01-07 PROBLEM — K92.2 GASTROINTESTINAL HEMORRHAGE, UNSPECIFIED: Chronic | Status: ACTIVE | Noted: 2017-09-01

## 2019-01-07 PROBLEM — I48.0 PAROXYSMAL ATRIAL FIBRILLATION: Chronic | Status: ACTIVE | Noted: 2017-09-01

## 2019-01-07 PROBLEM — C50.919 MALIGNANT NEOPLASM OF UNSPECIFIED SITE OF UNSPECIFIED FEMALE BREAST: Chronic | Status: ACTIVE | Noted: 2017-09-01

## 2019-01-30 ENCOUNTER — APPOINTMENT (OUTPATIENT)
Dept: GYNECOLOGIC ONCOLOGY | Facility: CLINIC | Age: 64
End: 2019-01-30

## 2019-12-10 RX ORDER — METOPROLOL TARTRATE 25 MG/1
25 TABLET, FILM COATED ORAL TWICE DAILY
Qty: 60 | Refills: 5 | Status: ACTIVE | COMMUNITY

## 2020-01-10 ENCOUNTER — APPOINTMENT (OUTPATIENT)
Dept: ELECTROPHYSIOLOGY | Facility: CLINIC | Age: 65
End: 2020-01-10
Payer: COMMERCIAL

## 2020-01-24 ENCOUNTER — APPOINTMENT (OUTPATIENT)
Dept: ELECTROPHYSIOLOGY | Facility: CLINIC | Age: 65
End: 2020-01-24
Payer: COMMERCIAL

## 2020-01-24 ENCOUNTER — NON-APPOINTMENT (OUTPATIENT)
Age: 65
End: 2020-01-24

## 2020-01-24 VITALS
OXYGEN SATURATION: 99 % | HEART RATE: 74 BPM | SYSTOLIC BLOOD PRESSURE: 119 MMHG | DIASTOLIC BLOOD PRESSURE: 70 MMHG | HEIGHT: 67 IN | WEIGHT: 131 LBS | BODY MASS INDEX: 20.56 KG/M2

## 2020-01-24 DIAGNOSIS — I47.2 VENTRICULAR TACHYCARDIA: ICD-10-CM

## 2020-01-24 PROCEDURE — 93000 ELECTROCARDIOGRAM COMPLETE: CPT

## 2020-01-24 PROCEDURE — 99215 OFFICE O/P EST HI 40 MIN: CPT

## 2020-01-24 RX ORDER — APIXABAN 5 MG/1
5 TABLET, FILM COATED ORAL
Qty: 60 | Refills: 6 | Status: DISCONTINUED | COMMUNITY
End: 2020-01-24

## 2020-01-24 RX ORDER — SUCRALFATE 1 G/1
1 TABLET ORAL
Refills: 0 | Status: DISCONTINUED | COMMUNITY
End: 2020-01-24

## 2020-01-24 NOTE — DISCUSSION/SUMMARY
[FreeTextEntry1] : 64 year old woman with history of atrial arrhythmia s/p ablation x 2.  Current evaluation is for episodes of nearsyncope.  Evaluation has been significant for NSVT on outpatient loop monitoring.  Prior evaluation shows normal LV function and includes an MRI done to assess for LA ablation that had no scar.  I suggested further evaluation with ETT.  Should this be negative, I would favor ILR implantation.  That is she has a history of atrial arrhythmia, NSVT, and episodes of nearsyncope.  We will discuss further after the treadmill test.

## 2020-01-24 NOTE — HISTORY OF PRESENT ILLNESS
[FreeTextEntry1] : Approximately 3 months ago she finished her exercise on the treadmill.  She was getting ready to go in the shower when she had an episode of nearsyncope. Her   her pulse and it was very rapid.  When she laid down symptoms abated.  She had a second episode a week later. Because of these episodes she was advised to wear a monitor. This showed episodes of NSVT.  In retrospect she notes that she was actually taking a lower dose of beta blocker than prescribed.  Specifically she had been breaking 50s in 1/2 but on a refill she got 25s and was taking 1/2 of 25s.  Since the monitoring period she has been doing well.  No palpitations. Her exercise tolerance is unchanged.  Currently she does 6 miles on the treadmill.

## 2020-02-14 ENCOUNTER — OUTPATIENT (OUTPATIENT)
Dept: OUTPATIENT SERVICES | Facility: HOSPITAL | Age: 65
LOS: 1 days | End: 2020-02-14
Payer: COMMERCIAL

## 2020-02-14 ENCOUNTER — APPOINTMENT (OUTPATIENT)
Dept: CV DIAGNOSTICS | Facility: HOSPITAL | Age: 65
End: 2020-02-14

## 2020-02-14 DIAGNOSIS — Z98.890 OTHER SPECIFIED POSTPROCEDURAL STATES: Chronic | ICD-10-CM

## 2020-02-14 DIAGNOSIS — I47.2 VENTRICULAR TACHYCARDIA: ICD-10-CM

## 2020-02-14 PROCEDURE — 93018 CV STRESS TEST I&R ONLY: CPT

## 2020-02-14 PROCEDURE — 93017 CV STRESS TEST TRACING ONLY: CPT

## 2020-02-14 PROCEDURE — 93016 CV STRESS TEST SUPVJ ONLY: CPT

## 2023-03-02 ENCOUNTER — TRANSCRIPTION ENCOUNTER (OUTPATIENT)
Age: 68
End: 2023-03-02

## 2025-02-07 ENCOUNTER — NON-APPOINTMENT (OUTPATIENT)
Age: 70
End: 2025-02-07

## 2025-02-07 ENCOUNTER — APPOINTMENT (OUTPATIENT)
Dept: OPHTHALMOLOGY | Facility: CLINIC | Age: 70
End: 2025-02-07
Payer: MEDICARE

## 2025-02-07 PROCEDURE — 99202 OFFICE O/P NEW SF 15 MIN: CPT

## 2025-02-07 PROCEDURE — 92134 CPTRZ OPH DX IMG PST SGM RTA: CPT
